# Patient Record
Sex: FEMALE | Race: WHITE | NOT HISPANIC OR LATINO | URBAN - METROPOLITAN AREA
[De-identification: names, ages, dates, MRNs, and addresses within clinical notes are randomized per-mention and may not be internally consistent; named-entity substitution may affect disease eponyms.]

---

## 2023-04-06 ENCOUNTER — OFFICE (OUTPATIENT)
Dept: URBAN - METROPOLITAN AREA CLINIC 105 | Facility: CLINIC | Age: 88
End: 2023-04-06
Payer: OTHER GOVERNMENT

## 2023-04-06 VITALS
HEART RATE: 87 BPM | SYSTOLIC BLOOD PRESSURE: 100 MMHG | HEIGHT: 60 IN | WEIGHT: 130 LBS | DIASTOLIC BLOOD PRESSURE: 70 MMHG

## 2023-04-06 DIAGNOSIS — K64.8 OTHER HEMORRHOIDS: ICD-10-CM

## 2023-04-06 PROCEDURE — 99203 OFFICE O/P NEW LOW 30 MIN: CPT

## 2023-05-09 ENCOUNTER — OFFICE (OUTPATIENT)
Dept: URBAN - METROPOLITAN AREA CLINIC 105 | Facility: CLINIC | Age: 88
End: 2023-05-09
Payer: OTHER GOVERNMENT

## 2023-05-09 VITALS
SYSTOLIC BLOOD PRESSURE: 130 MMHG | DIASTOLIC BLOOD PRESSURE: 70 MMHG | HEIGHT: 60 IN | WEIGHT: 130 LBS | HEART RATE: 81 BPM | OXYGEN SATURATION: 94 %

## 2023-05-09 DIAGNOSIS — K62.5 HEMORRHAGE OF ANUS AND RECTUM: ICD-10-CM

## 2023-05-09 DIAGNOSIS — K64.4 RESIDUAL HEMORRHOIDAL SKIN TAGS: ICD-10-CM

## 2023-05-09 DIAGNOSIS — K59.00 CONSTIPATION, UNSPECIFIED: ICD-10-CM

## 2023-05-09 DIAGNOSIS — K64.8 OTHER HEMORRHOIDS: ICD-10-CM

## 2023-05-09 PROBLEM — K64.3 FOURTH DEGREE HEMORRHOIDS: Status: ACTIVE | Noted: 2023-05-09

## 2023-05-09 PROCEDURE — 99204 OFFICE O/P NEW MOD 45 MIN: CPT | Mod: 25

## 2023-05-09 PROCEDURE — 46221 LIGATION OF HEMORRHOID(S): CPT

## 2023-05-09 NOTE — SERVICENOTES
– We will repeat hemorrhoid banding in approximate 4 weeks
– Start taking Citrucel 1 scoop mixed with water twice daily
– Continue stool softener for constipation
– Continue sitz baths

## 2023-05-09 NOTE — SERVICEHPINOTES
brPatient presents for evaluation of hemorrhoids. She is accompanied by her son. Patient with pain and prolapse and leakage. Occasionally with bleeding. Last colonoscopy in 2013 with small internal hemorrhoids and sigmoid diverticulosis. She does not take any blood thinners. We previously discussed referral to surgery for grade 4 hemorrhoids but her and her son would like to proceed with hemorrhoid banding.. Anoscopy with grade 4 right anterior and left lateral hemorrhoids.
br
brPatient reports continued issues with bleeding and leakage. She recently started Lasix for diuretic. She takes stool softeners for constipation. She does not take a fiber supplement. She has been doing sitz baths. She denies latex allergy. We discussed colonoscopy for rectal bleeding but patient and her son are not interested in pursuing.      The patient    having   rectal bleeding, anorectal pain and fecal urgency  .

## 2023-07-03 ENCOUNTER — OFFICE (OUTPATIENT)
Dept: URBAN - METROPOLITAN AREA CLINIC 105 | Facility: CLINIC | Age: 88
End: 2023-07-03
Payer: OTHER GOVERNMENT

## 2023-07-03 VITALS — WEIGHT: 133 LBS | DIASTOLIC BLOOD PRESSURE: 60 MMHG | SYSTOLIC BLOOD PRESSURE: 118 MMHG | HEIGHT: 60 IN

## 2023-07-03 DIAGNOSIS — K64.3 FOURTH DEGREE HEMORRHOIDS: ICD-10-CM

## 2023-07-03 DIAGNOSIS — K64.4 RESIDUAL HEMORRHOIDAL SKIN TAGS: ICD-10-CM

## 2023-07-03 DIAGNOSIS — K62.5 HEMORRHAGE OF ANUS AND RECTUM: ICD-10-CM

## 2023-07-03 PROCEDURE — 46221 LIGATION OF HEMORRHOID(S): CPT

## 2023-07-03 RX ORDER — ANORECTAL OINTMENT 15.7; .44; 24; 20.6 G/100G; G/100G; G/100G; G/100G
OINTMENT TOPICAL
Qty: 1 | Refills: 1 | Status: ACTIVE
Start: 2023-07-03

## 2023-07-03 RX ORDER — LIDOCAINE 50 MG/G
CREAM TOPICAL
Qty: 1 | Refills: 2 | Status: ACTIVE
Start: 2023-07-03

## 2023-07-03 NOTE — SERVICEHPINOTES
br 
Patient presents for repeat hemorrhoid banding. Patient last seen for hemorrhoid banding 5/9/2023. Patient reported pain with prolapse and leakage with occasional bleeding. Prior anoscopy with grade 4 right anterior and left lateral hemorrhoids. She had reported continued issues with bleeding and leakage. She had recently started Lasix for diuretic. She was taking stool softeners for constipation but was not taking fiber supplement. She had been doing sitz baths. The right anterior hemorrhoid was banded at that time. Patient and her son were not interested in colonoscopy at the time. Patient was advised to start taking Citrucel 1 scoop mixed with water twice daily. She was advised to continue stool softener for constipation. She is advised to continue sitz baths.
br
brPatient reports issues with bleeding and leakage, which has been improved. She is doing well overall. She has constipation which is mildly improved. She is taking Citrucel 2 scoops in AM and 1 scoop in PM. She has not been doing sitz bath in the last few days. She denies latex allergy. She has been applying Preparation H topically.  The patient   admits   having   rectal bleeding and anorectal pain  .

## 2023-07-03 NOTE — SERVICENOTES
-Apply RectiCare to apply twice daily as needed.
-Apply Calmoseptine to perianal area as needed for barrier ointment
-We will repeat hemorrhoid banding in approximate 4 weeks
-Continue Citrucel twice daily for constipation
-Continue sitz baths

## 2023-08-01 ENCOUNTER — OFFICE (OUTPATIENT)
Dept: URBAN - METROPOLITAN AREA CLINIC 105 | Facility: CLINIC | Age: 88
End: 2023-08-01
Payer: OTHER GOVERNMENT

## 2023-08-01 VITALS
HEIGHT: 60 IN | WEIGHT: 133 LBS | SYSTOLIC BLOOD PRESSURE: 110 MMHG | HEART RATE: 88 BPM | DIASTOLIC BLOOD PRESSURE: 62 MMHG

## 2023-08-01 DIAGNOSIS — K64.3 FOURTH DEGREE HEMORRHOIDS: ICD-10-CM

## 2023-08-01 PROCEDURE — 46221 LIGATION OF HEMORRHOID(S): CPT

## 2023-08-01 NOTE — SERVICENOTES
– Let us know if you have further bleeding and we can consider another hemorrhoid banding treatment
– Follow-up in 3 months
– Continue Citrucel for fiber supplement, 2 scoops in the morning and 1 scoop in the evening
–Continue RectiCare as needed for perianal pain
– Apply a barrier ointment such as Calmoseptine

## 2023-10-04 ENCOUNTER — OFFICE (OUTPATIENT)
Dept: URBAN - METROPOLITAN AREA CLINIC 105 | Facility: CLINIC | Age: 88
End: 2023-10-04
Payer: OTHER GOVERNMENT

## 2023-10-04 VITALS
HEIGHT: 60 IN | HEART RATE: 100 BPM | SYSTOLIC BLOOD PRESSURE: 102 MMHG | DIASTOLIC BLOOD PRESSURE: 60 MMHG | OXYGEN SATURATION: 99 % | WEIGHT: 120 LBS

## 2023-10-04 DIAGNOSIS — K64.8 OTHER HEMORRHOIDS: ICD-10-CM

## 2023-10-04 DIAGNOSIS — K64.4 RESIDUAL HEMORRHOIDAL SKIN TAGS: ICD-10-CM

## 2023-10-04 PROCEDURE — 46600 DIAGNOSTIC ANOSCOPY SPX: CPT

## 2023-10-04 PROCEDURE — 99214 OFFICE O/P EST MOD 30 MIN: CPT | Mod: 25
